# Patient Record
Sex: MALE | Race: BLACK OR AFRICAN AMERICAN | Employment: UNEMPLOYED
[De-identification: names, ages, dates, MRNs, and addresses within clinical notes are randomized per-mention and may not be internally consistent; named-entity substitution may affect disease eponyms.]

---

## 2024-05-14 ENCOUNTER — APPOINTMENT (OUTPATIENT)
Facility: HOSPITAL | Age: 34
End: 2024-05-14
Payer: MEDICAID

## 2024-05-14 ENCOUNTER — HOSPITAL ENCOUNTER (INPATIENT)
Facility: HOSPITAL | Age: 34
LOS: 4 days | Discharge: OTHER INSTITUTION | End: 2024-05-18
Attending: STUDENT IN AN ORGANIZED HEALTH CARE EDUCATION/TRAINING PROGRAM | Admitting: INTERNAL MEDICINE
Payer: MEDICAID

## 2024-05-14 DIAGNOSIS — R56.9 SEIZURE (HCC): Primary | ICD-10-CM

## 2024-05-14 LAB
ALBUMIN SERPL-MCNC: 3.5 G/DL (ref 3.4–5)
ALBUMIN/GLOB SERPL: 0.6 (ref 0.8–1.7)
ALP SERPL-CCNC: 49 U/L (ref 45–117)
ALT SERPL-CCNC: 24 U/L (ref 16–61)
AMPHET UR QL SCN: NEGATIVE
ANION GAP SERPL CALC-SCNC: 10 MMOL/L (ref 3–18)
APPEARANCE UR: CLEAR
AST SERPL-CCNC: 24 U/L (ref 10–38)
BARBITURATES UR QL SCN: NEGATIVE
BASOPHILS # BLD: 0 K/UL (ref 0–0.1)
BASOPHILS NFR BLD: 1 % (ref 0–2)
BENZODIAZ UR QL: NEGATIVE
BILIRUB DIRECT SERPL-MCNC: 0.2 MG/DL (ref 0–0.2)
BILIRUB SERPL-MCNC: 0.9 MG/DL (ref 0.2–1)
BILIRUB UR QL: NEGATIVE
BUN SERPL-MCNC: 13 MG/DL (ref 7–18)
BUN/CREAT SERPL: 11 (ref 12–20)
CALCIUM SERPL-MCNC: 10 MG/DL (ref 8.5–10.1)
CANNABINOIDS UR QL SCN: NEGATIVE
CHLORIDE SERPL-SCNC: 99 MMOL/L (ref 100–111)
CO2 SERPL-SCNC: 24 MMOL/L (ref 21–32)
COCAINE UR QL SCN: NEGATIVE
COLOR UR: YELLOW
CREAT SERPL-MCNC: 1.19 MG/DL (ref 0.6–1.3)
DIFFERENTIAL METHOD BLD: ABNORMAL
EOSINOPHIL # BLD: 0 K/UL (ref 0–0.4)
EOSINOPHIL NFR BLD: 1 % (ref 0–5)
ERYTHROCYTE [DISTWIDTH] IN BLOOD BY AUTOMATED COUNT: 12.5 % (ref 11.6–14.5)
ETHANOL SERPL-MCNC: <3 MG/DL (ref 0–3)
GLOBULIN SER CALC-MCNC: 5.5 G/DL (ref 2–4)
GLUCOSE SERPL-MCNC: 142 MG/DL (ref 74–99)
GLUCOSE UR STRIP.AUTO-MCNC: NEGATIVE MG/DL
HCT VFR BLD AUTO: 36.7 % (ref 36–48)
HGB BLD-MCNC: 12.2 G/DL (ref 13–16)
HGB UR QL STRIP: NEGATIVE
IMM GRANULOCYTES # BLD AUTO: 0 K/UL (ref 0–0.04)
IMM GRANULOCYTES NFR BLD AUTO: 0 % (ref 0–0.5)
KETONES UR QL STRIP.AUTO: NEGATIVE MG/DL
LACTATE SERPL-SCNC: 1.3 MMOL/L (ref 0.4–2)
LACTATE SERPL-SCNC: 3.5 MMOL/L (ref 0.4–2)
LACTATE SERPL-SCNC: 5.9 MMOL/L (ref 0.4–2)
LEUKOCYTE ESTERASE UR QL STRIP.AUTO: NEGATIVE
LIPASE SERPL-CCNC: 29 U/L (ref 13–75)
LYMPHOCYTES # BLD: 0.6 K/UL (ref 0.9–3.6)
LYMPHOCYTES NFR BLD: 10 % (ref 21–52)
Lab: NORMAL
MAGNESIUM SERPL-MCNC: 1.6 MG/DL (ref 1.6–2.6)
MCH RBC QN AUTO: 29.8 PG (ref 24–34)
MCHC RBC AUTO-ENTMCNC: 33.2 G/DL (ref 31–37)
MCV RBC AUTO: 89.7 FL (ref 78–100)
METHADONE UR QL: NEGATIVE
MONOCYTES # BLD: 0.5 K/UL (ref 0.05–1.2)
MONOCYTES NFR BLD: 8 % (ref 3–10)
NEUTS SEG # BLD: 4.5 K/UL (ref 1.8–8)
NEUTS SEG NFR BLD: 80 % (ref 40–73)
NITRITE UR QL STRIP.AUTO: NEGATIVE
NRBC # BLD: 0 K/UL (ref 0–0.01)
NRBC BLD-RTO: 0 PER 100 WBC
OPIATES UR QL: NEGATIVE
PCP UR QL: NEGATIVE
PH UR STRIP: 6.5 (ref 5–8)
PLATELET # BLD AUTO: 277 K/UL (ref 135–420)
PMV BLD AUTO: 10.1 FL (ref 9.2–11.8)
POTASSIUM SERPL-SCNC: 3.7 MMOL/L (ref 3.5–5.5)
PROT SERPL-MCNC: 9 G/DL (ref 6.4–8.2)
PROT UR STRIP-MCNC: NEGATIVE MG/DL
RBC # BLD AUTO: 4.09 M/UL (ref 4.35–5.65)
SODIUM SERPL-SCNC: 133 MMOL/L (ref 136–145)
SP GR UR REFRACTOMETRY: 1.01 (ref 1–1.03)
T PALLIDUM AB SER QL IA: NONREACTIVE
UROBILINOGEN UR QL STRIP.AUTO: 0.2 EU/DL (ref 0.2–1)
WBC # BLD AUTO: 5.6 K/UL (ref 4.6–13.2)

## 2024-05-14 PROCEDURE — 80076 HEPATIC FUNCTION PANEL: CPT

## 2024-05-14 PROCEDURE — 36415 COLL VENOUS BLD VENIPUNCTURE: CPT

## 2024-05-14 PROCEDURE — 87040 BLOOD CULTURE FOR BACTERIA: CPT

## 2024-05-14 PROCEDURE — 71045 X-RAY EXAM CHEST 1 VIEW: CPT

## 2024-05-14 PROCEDURE — 83735 ASSAY OF MAGNESIUM: CPT

## 2024-05-14 PROCEDURE — 85025 COMPLETE CBC W/AUTO DIFF WBC: CPT

## 2024-05-14 PROCEDURE — 83605 ASSAY OF LACTIC ACID: CPT

## 2024-05-14 PROCEDURE — 99285 EMERGENCY DEPT VISIT HI MDM: CPT

## 2024-05-14 PROCEDURE — 93005 ELECTROCARDIOGRAM TRACING: CPT | Performed by: STUDENT IN AN ORGANIZED HEALTH CARE EDUCATION/TRAINING PROGRAM

## 2024-05-14 PROCEDURE — 2580000003 HC RX 258: Performed by: STUDENT IN AN ORGANIZED HEALTH CARE EDUCATION/TRAINING PROGRAM

## 2024-05-14 PROCEDURE — 6360000002 HC RX W HCPCS: Performed by: STUDENT IN AN ORGANIZED HEALTH CARE EDUCATION/TRAINING PROGRAM

## 2024-05-14 PROCEDURE — 96376 TX/PRO/DX INJ SAME DRUG ADON: CPT

## 2024-05-14 PROCEDURE — 99222 1ST HOSP IP/OBS MODERATE 55: CPT | Performed by: INTERNAL MEDICINE

## 2024-05-14 PROCEDURE — 96374 THER/PROPH/DIAG INJ IV PUSH: CPT

## 2024-05-14 PROCEDURE — 82077 ASSAY SPEC XCP UR&BREATH IA: CPT

## 2024-05-14 PROCEDURE — 2580000003 HC RX 258: Performed by: INTERNAL MEDICINE

## 2024-05-14 PROCEDURE — 70450 CT HEAD/BRAIN W/O DYE: CPT

## 2024-05-14 PROCEDURE — 6370000000 HC RX 637 (ALT 250 FOR IP): Performed by: INTERNAL MEDICINE

## 2024-05-14 PROCEDURE — 80307 DRUG TEST PRSMV CHEM ANLYZR: CPT

## 2024-05-14 PROCEDURE — 6360000002 HC RX W HCPCS: Performed by: INTERNAL MEDICINE

## 2024-05-14 PROCEDURE — 86780 TREPONEMA PALLIDUM: CPT

## 2024-05-14 PROCEDURE — 83690 ASSAY OF LIPASE: CPT

## 2024-05-14 PROCEDURE — 81003 URINALYSIS AUTO W/O SCOPE: CPT

## 2024-05-14 PROCEDURE — 80048 BASIC METABOLIC PNL TOTAL CA: CPT

## 2024-05-14 PROCEDURE — 93005 ELECTROCARDIOGRAM TRACING: CPT | Performed by: INTERNAL MEDICINE

## 2024-05-14 PROCEDURE — 1100000003 HC PRIVATE W/ TELEMETRY

## 2024-05-14 RX ORDER — MIRTAZAPINE 15 MG/1
15 TABLET, FILM COATED ORAL NIGHTLY
Status: ON HOLD | COMMUNITY
End: 2024-05-18

## 2024-05-14 RX ORDER — ACETAMINOPHEN 325 MG/1
650 TABLET ORAL EVERY 6 HOURS PRN
Status: DISCONTINUED | OUTPATIENT
Start: 2024-05-14 | End: 2024-05-18 | Stop reason: HOSPADM

## 2024-05-14 RX ORDER — POLYETHYLENE GLYCOL 3350 17 G/17G
17 POWDER, FOR SOLUTION ORAL DAILY PRN
Status: DISCONTINUED | OUTPATIENT
Start: 2024-05-14 | End: 2024-05-18 | Stop reason: HOSPADM

## 2024-05-14 RX ORDER — SODIUM CHLORIDE 0.9 % (FLUSH) 0.9 %
5-40 SYRINGE (ML) INJECTION PRN
Status: DISCONTINUED | OUTPATIENT
Start: 2024-05-14 | End: 2024-05-18 | Stop reason: HOSPADM

## 2024-05-14 RX ORDER — LEVETIRACETAM 500 MG/5ML
500 INJECTION, SOLUTION, CONCENTRATE INTRAVENOUS ONCE
Status: COMPLETED | OUTPATIENT
Start: 2024-05-14 | End: 2024-05-14

## 2024-05-14 RX ORDER — POTASSIUM CHLORIDE 7.45 MG/ML
10 INJECTION INTRAVENOUS PRN
Status: DISCONTINUED | OUTPATIENT
Start: 2024-05-14 | End: 2024-05-18 | Stop reason: HOSPADM

## 2024-05-14 RX ORDER — MIRTAZAPINE 15 MG/1
15 TABLET, FILM COATED ORAL NIGHTLY
Status: DISCONTINUED | OUTPATIENT
Start: 2024-05-14 | End: 2024-05-18 | Stop reason: HOSPADM

## 2024-05-14 RX ORDER — LORAZEPAM 2 MG/ML
2 INJECTION INTRAMUSCULAR EVERY 5 MIN PRN
Status: DISCONTINUED | OUTPATIENT
Start: 2024-05-14 | End: 2024-05-18 | Stop reason: HOSPADM

## 2024-05-14 RX ORDER — SODIUM CHLORIDE 0.9 % (FLUSH) 0.9 %
5-40 SYRINGE (ML) INJECTION EVERY 12 HOURS SCHEDULED
Status: DISCONTINUED | OUTPATIENT
Start: 2024-05-14 | End: 2024-05-18 | Stop reason: HOSPADM

## 2024-05-14 RX ORDER — ACETAMINOPHEN 650 MG/1
650 SUPPOSITORY RECTAL EVERY 6 HOURS PRN
Status: DISCONTINUED | OUTPATIENT
Start: 2024-05-14 | End: 2024-05-18 | Stop reason: HOSPADM

## 2024-05-14 RX ORDER — ALBUTEROL SULFATE 0.63 MG/3ML
1 SOLUTION RESPIRATORY (INHALATION) EVERY 6 HOURS PRN
COMMUNITY

## 2024-05-14 RX ORDER — TRIAMCINOLONE ACETONIDE 1 MG/G
1 CREAM TOPICAL 2 TIMES DAILY
Status: ON HOLD | COMMUNITY
Start: 2024-02-27 | End: 2024-05-18

## 2024-05-14 RX ORDER — LORAZEPAM 2 MG/ML
4 INJECTION INTRAMUSCULAR EVERY 5 MIN PRN
Status: DISCONTINUED | OUTPATIENT
Start: 2024-05-14 | End: 2024-05-14

## 2024-05-14 RX ORDER — SODIUM CHLORIDE, SODIUM LACTATE, POTASSIUM CHLORIDE, CALCIUM CHLORIDE 600; 310; 30; 20 MG/100ML; MG/100ML; MG/100ML; MG/100ML
INJECTION, SOLUTION INTRAVENOUS CONTINUOUS
Status: DISPENSED | OUTPATIENT
Start: 2024-05-14 | End: 2024-05-16

## 2024-05-14 RX ORDER — MULTIVITAMIN
15 TABLET ORAL DAILY
Status: ON HOLD | COMMUNITY
Start: 2024-04-12 | End: 2024-05-18

## 2024-05-14 RX ORDER — CETIRIZINE HYDROCHLORIDE 10 MG/1
10 TABLET ORAL DAILY
Status: ON HOLD | COMMUNITY
Start: 2024-04-12 | End: 2024-05-18

## 2024-05-14 RX ORDER — FOLIC ACID 1 MG/1
1 TABLET ORAL DAILY
Status: ON HOLD | COMMUNITY
End: 2024-05-18

## 2024-05-14 RX ORDER — CETIRIZINE HYDROCHLORIDE 10 MG/1
10 TABLET ORAL DAILY
Status: DISCONTINUED | OUTPATIENT
Start: 2024-05-14 | End: 2024-05-18 | Stop reason: HOSPADM

## 2024-05-14 RX ORDER — ENOXAPARIN SODIUM 100 MG/ML
40 INJECTION SUBCUTANEOUS EVERY 24 HOURS
Status: DISCONTINUED | OUTPATIENT
Start: 2024-05-14 | End: 2024-05-14

## 2024-05-14 RX ORDER — HYDROXYZINE HYDROCHLORIDE 10 MG/1
10 TABLET, FILM COATED ORAL 3 TIMES DAILY PRN
Status: DISCONTINUED | OUTPATIENT
Start: 2024-05-14 | End: 2024-05-18 | Stop reason: HOSPADM

## 2024-05-14 RX ORDER — MAGNESIUM SULFATE IN WATER 40 MG/ML
2000 INJECTION, SOLUTION INTRAVENOUS PRN
Status: DISCONTINUED | OUTPATIENT
Start: 2024-05-14 | End: 2024-05-18 | Stop reason: HOSPADM

## 2024-05-14 RX ORDER — MULTIVITAMIN WITH IRON
1 TABLET ORAL DAILY
Status: DISCONTINUED | OUTPATIENT
Start: 2024-05-14 | End: 2024-05-18 | Stop reason: HOSPADM

## 2024-05-14 RX ORDER — TRIAMCINOLONE ACETONIDE 1 MG/G
CREAM TOPICAL 2 TIMES DAILY
Status: DISCONTINUED | OUTPATIENT
Start: 2024-05-15 | End: 2024-05-18 | Stop reason: HOSPADM

## 2024-05-14 RX ORDER — FOLIC ACID 1 MG/1
1 TABLET ORAL DAILY
Status: DISCONTINUED | OUTPATIENT
Start: 2024-05-14 | End: 2024-05-18 | Stop reason: HOSPADM

## 2024-05-14 RX ORDER — SODIUM CHLORIDE 9 MG/ML
INJECTION, SOLUTION INTRAVENOUS PRN
Status: DISCONTINUED | OUTPATIENT
Start: 2024-05-14 | End: 2024-05-18 | Stop reason: HOSPADM

## 2024-05-14 RX ORDER — LEVETIRACETAM 500 MG/5ML
1000 INJECTION, SOLUTION, CONCENTRATE INTRAVENOUS ONCE
Status: COMPLETED | OUTPATIENT
Start: 2024-05-14 | End: 2024-05-14

## 2024-05-14 RX ORDER — HEPARIN SODIUM 5000 [USP'U]/ML
5000 INJECTION, SOLUTION INTRAVENOUS; SUBCUTANEOUS EVERY 8 HOURS SCHEDULED
Status: DISCONTINUED | OUTPATIENT
Start: 2024-05-14 | End: 2024-05-18 | Stop reason: HOSPADM

## 2024-05-14 RX ORDER — ONDANSETRON 4 MG/1
4 TABLET, ORALLY DISINTEGRATING ORAL EVERY 8 HOURS PRN
Status: DISCONTINUED | OUTPATIENT
Start: 2024-05-14 | End: 2024-05-18 | Stop reason: HOSPADM

## 2024-05-14 RX ORDER — ONDANSETRON 2 MG/ML
4 INJECTION INTRAMUSCULAR; INTRAVENOUS EVERY 6 HOURS PRN
Status: DISCONTINUED | OUTPATIENT
Start: 2024-05-14 | End: 2024-05-18 | Stop reason: HOSPADM

## 2024-05-14 RX ORDER — LEVETIRACETAM 500 MG/1
500 TABLET ORAL 2 TIMES DAILY
Status: DISCONTINUED | OUTPATIENT
Start: 2024-05-14 | End: 2024-05-18 | Stop reason: HOSPADM

## 2024-05-14 RX ORDER — SODIUM CHLORIDE, SODIUM LACTATE, POTASSIUM CHLORIDE, AND CALCIUM CHLORIDE .6; .31; .03; .02 G/100ML; G/100ML; G/100ML; G/100ML
1000 INJECTION, SOLUTION INTRAVENOUS ONCE
Status: COMPLETED | OUTPATIENT
Start: 2024-05-14 | End: 2024-05-14

## 2024-05-14 RX ADMIN — LEVETIRACETAM 500 MG: 500 TABLET, FILM COATED ORAL at 20:33

## 2024-05-14 RX ADMIN — FOLIC ACID 1 MG: 1 TABLET ORAL at 20:21

## 2024-05-14 RX ADMIN — LEVETIRACETAM 1000 MG: 100 INJECTION, SOLUTION, CONCENTRATE INTRAVENOUS at 16:11

## 2024-05-14 RX ADMIN — ENOXAPARIN SODIUM 40 MG: 100 INJECTION SUBCUTANEOUS at 20:22

## 2024-05-14 RX ADMIN — SODIUM CHLORIDE, POTASSIUM CHLORIDE, SODIUM LACTATE AND CALCIUM CHLORIDE: 600; 310; 30; 20 INJECTION, SOLUTION INTRAVENOUS at 20:21

## 2024-05-14 RX ADMIN — CETIRIZINE HYDROCHLORIDE 10 MG: 10 TABLET, FILM COATED ORAL at 20:21

## 2024-05-14 RX ADMIN — SODIUM CHLORIDE, PRESERVATIVE FREE 10 ML: 5 INJECTION INTRAVENOUS at 20:22

## 2024-05-14 RX ADMIN — SODIUM CHLORIDE, POTASSIUM CHLORIDE, SODIUM LACTATE AND CALCIUM CHLORIDE 1000 ML: 600; 310; 30; 20 INJECTION, SOLUTION INTRAVENOUS at 13:47

## 2024-05-14 RX ADMIN — MIRTAZAPINE 15 MG: 15 TABLET, FILM COATED ORAL at 20:21

## 2024-05-14 RX ADMIN — THERA TABS 1 TABLET: TAB at 20:21

## 2024-05-14 RX ADMIN — LEVETIRACETAM 500 MG: 100 INJECTION, SOLUTION, CONCENTRATE INTRAVENOUS at 13:47

## 2024-05-14 ASSESSMENT — PAIN SCALES - GENERAL
PAINLEVEL_OUTOF10: 0
PAINLEVEL_OUTOF10: 0

## 2024-05-14 ASSESSMENT — PAIN - FUNCTIONAL ASSESSMENT: PAIN_FUNCTIONAL_ASSESSMENT: NONE - DENIES PAIN

## 2024-05-14 NOTE — ED TRIAGE NOTES
Pt arrived via EMS from [facility] recovery c/o seizures  x2 1 min-2 min seizures. Pt was post ictal once EMS arrived. Pt c/o N/V at [facility].

## 2024-05-14 NOTE — ED NOTES
..TRANSFER - OUT REPORT:    Verbal report given to XAVI Denis on Robert BRICENO Washington  being transferred to [address] [place] [address] for routine progression of patient care       Report consisted of patient's Situation, Background, Assessment and   Recommendations(SBAR).     Information from the following report(s) ED SBAR was reviewed with the receiving nurse.    Elias Fall Assessment:    Presents to emergency department  because of falls (Syncope, seizure, or loss of consciousness): Yes  Age > 70: No  Altered Mental Status, Intoxication with alcohol or substance confusion (Disorientation, impaired judgment, poor safety awaremess, or inability to follow instructions): No  Impaired Mobility: Ambulates or transfers with assistive devices or assistance; Unable to ambulate or transer.: No  Nursing Judgement: No          Lines:   Peripheral IV 05/14/24 Left Antecubital (Active)   Site Assessment Clean, dry & intact 05/14/24 1314   Line Status Blood return noted 05/14/24 1314   Phlebitis Assessment No symptoms 05/14/24 1314   Infiltration Assessment 0 05/14/24 1314        Opportunity for questions and clarification was provided.      Patient transported with:  transport

## 2024-05-14 NOTE — ED PROVIDER NOTES
EMERGENCY DEPARTMENT HISTORY AND PHYSICAL EXAM      Date: 5/14/2024  Patient Name: Robert Soto    History of Presenting Illness     Chief Complaint   Patient presents with    Seizures       History (Context): Robert Soto is a 33 y.o. male with history of alcohol use disorder with recent detox and rehab attendance which she completed 2 weeks ago reportedly and states a couple days ago he had 5-6 drinks, denies any history of complicated withdrawal.  Additional history of seizure reportedly as a child, has not had an episode since and has not been on any medications nor required them reportedly.  He presents to the ED today with chief complaint of reported loss of consciousness with seizure-like activity while at [facility].  Patient does not recall events, reportedly they lasted 1 to 2 minutes involved generalized convulsion, 2 episodes.  EMS states patient was postictal on their arrival, blood sugar on scene 120.  Patient denies any recent infectious symptoms, chest pain, lightheadedness, abdominal pain, nausea/vomiting/diarrhea.  He has an extensive mucocutaneous rash in various stages of healing involving the nares, face, trunk, extremities.  On review of the record patient was evaluated by [facility] dermatology with pathology sample sent felt to be overall most consistent with sarcoidosis.  Patient denies any acute change in the rash.      PCP: No primary care provider on file.    Current Facility-Administered Medications   Medication Dose Route Frequency Provider Last Rate Last Admin    lactated ringers bolus 1,000 mL  1,000 mL IntraVENous Once Micky Campbell .9 mL/hr at 05/14/24 1347 1,000 mL at 05/14/24 1347     Current Outpatient Medications   Medication Sig Dispense Refill    albuterol (ACCUNEB) 0.63 MG/3ML nebulizer solution Take 3 mLs by nebulization every 6 hours as needed for Wheezing         Past History     Past Medical History:   Past Medical History:   Diagnosis Date  ETOH    Lipase    Urinalysis    Urine Drug Screen    T. pallidum Ab    EKG 12 Lead (Syncope)          ED Course:   ED Course as of 05/14/24 1913   Tue May 14, 2024   1321 32 yo male etoh abuse  cc- seizure from [facility] / came out of rehab after detox / drank 5-6 days ago /poc-120 / physical exam- fasciculation of the eyes when its closed. Lesions on his face and hands / plan- labs and imaging     Of note patient states that he has skin lesions.  This has been going on for years.  Patient has been seen by dermatologist. [KS]   1347 EKG: Sinus rhythm, heart rate 74, TN QTc within normal limits, left axis deviation, no acute ischemic changes. [KS]   1623 Lab work as reviewed by me notably without leukocytosis, no significant anemia, no thrombocytosis or thrombocytopenia, no significant electrolyte derangement, creatinine 1.19 without comparison, no urinary complaints, no significant hepatic panel abnormality, T. pallidum antibody nonreactive, UDS unremarkable, initial lactate elevated and downtrending consistent with reported seizure, UA unremarkable.  EtOH negative, lipase not elevated.  Loaded patient with 1500 mg Keppra, CT head pending at this time, no changes in status here, no seizure-like activity, no concerning hemodynamic trends.  Pending CT head findings we will plan for discussion with neurology for follow-up and likely disposition for outpatient workup. [JS]   4532 CT head Noncon showing:  IMPRESSION:  Increased hypoattenuation of the right temporal lobe white matter, nonspecific  and indeterminate etiology. Herpes encephalitis could be considered. Recommend  further assessment with MRI.  Overall exam and history less consistent with repeat encephalitis, question whether this could be related to neurosarcoid given patient's extensive dermatologic lesions.  Neurology paged at this time to discuss findings and recommendations. [JS]   6998 Disc findings with Dr. Tucker [JS]      ED Course User Index  [JS]

## 2024-05-14 NOTE — ED NOTES
Pt to CT via transport, pt tolerated medication administration well, no adverse reactions noted, plan of care ongoing

## 2024-05-14 NOTE — ED NOTES
Pt has urinal at bedside for urine specimen collection, pt states unable to use it at this time, plan of care ongoing

## 2024-05-14 NOTE — H&P
History & Physical    Patient: Robert Soto MRN: [id number]  [facility]: [id number]    YOB: 1990  Age: 33 y.o.  Sex: male      DOA: 5/14/2024    Chief Complaint:   Chief Complaint   Patient presents with    Seizures          HPI:     Robert Soto is a 33 y.o.  male with a past medical history of asthma, alcohol abuse, chronic facial and body rash who presents from [facility] for seizure.  Patient notes he does not remember much but he was reported to have had 2 seizures at [facility] which were witnessed by fellow residents.  He was reported to have convulsive like activity.  The patient does not remember biting his tongue or anything about the seizures.  He reports that he has never had a seizure before to his knowledge.  Regarding the patient's rash it has been present for over a year, a biopsy at [facility] seemed to suggest possible sarcoidosis however the patient did not follow-up with them in the office.  Recently seen in the [facility] ED in February 2024 for this rash however he was discharged with recommended follow-up with dermatology.  Patient has had multiple visits to other hospitals for visual hallucinations, corneal ulcer, patient also recently discharged from inpatient psychiatry unit for substance-induced mood disorder and adjustment disorder with depression.  The patient verbalized suicidal intent at that time but denies it currently.  The patient was recommended to take mirtazapine and naltrexone on discharge from the psychiatric unit in February 2024.  The patient has not had a drink for roughly a week since he has been at [facility] doing inpatient detox.    In the emergency department it was noted the patient was afebrile, with a respiratory rate of 18, a heart rate of 92, a blood pressure of 115/82 and oxygen sat of 100% on room air.  Labs were notable for sodium of 133, a chloride of 99, a lactic acid of 5.9, a hemoglobin of 12.2.  CT head showed increased  admitted to 4 S.  Cardiac monitoring  Seizure precautions  Ativan as needed for seizure activity  Keppra 500 mg twice daily as recommended by neurology  Discussed case with neurology, they recommended a MRI with and without contrast to evaluate for neurosarcoidosis.  Neurosarcoidosis is thought to be less likely.  Neurology recommended against systemic steroids.  Seizure possibly related to alcohol withdrawal, patient reports last drink about 5 to 6 days ago.  Corticosteroid topical twice daily on arms and legs ordered  Neurology consulted  ID consulted, they recommended MRI with and without contrast, hold acyclovir for now. Less suspicion for herpes encephalitis   Lumbar puncture ordered, fluid studies ordered  EEG ordered  Lactate has normalized  CK level ordered  Urine drug screen is negative  PT/OT  Patient requesting psychiatry consult in the morning, denies suicidal intent  Resume Remeron 15 mg and hydroxyzine for anxiety    DVT/GI Prophylaxis: Lovenox    Discussed with patient and  at bedside about hospital admission and my plan care, both understood and agree with my plan care.    Daniel Pagan, DO  5/14/2024 7:28 PM    Disclaimer: Sections of this note are dictated using utilizing voice recognition software. Minor typographical errors may be present. If questions arise, please do not hesitate to contact me or call our department.

## 2024-05-15 ENCOUNTER — APPOINTMENT (OUTPATIENT)
Facility: HOSPITAL | Age: 34
End: 2024-05-15
Payer: MEDICAID

## 2024-05-15 PROBLEM — B00.4 ENCEPHALITIS DUE TO HUMAN HERPES SIMPLEX VIRUS (HSV): Status: ACTIVE | Noted: 2024-05-15

## 2024-05-15 LAB
ALBUMIN SERPL-MCNC: 3.1 G/DL (ref 3.4–5)
ALBUMIN/GLOB SERPL: 0.6 (ref 0.8–1.7)
ALP SERPL-CCNC: 41 U/L (ref 45–117)
ALT SERPL-CCNC: 21 U/L (ref 16–61)
ANION GAP SERPL CALC-SCNC: 4 MMOL/L (ref 3–18)
AST SERPL-CCNC: 23 U/L (ref 10–38)
BASOPHILS # BLD: 0 K/UL (ref 0–0.1)
BASOPHILS NFR BLD: 1 % (ref 0–2)
BILIRUB SERPL-MCNC: 1 MG/DL (ref 0.2–1)
BUN SERPL-MCNC: 14 MG/DL (ref 7–18)
BUN/CREAT SERPL: 16 (ref 12–20)
CALCIUM SERPL-MCNC: 9.2 MG/DL (ref 8.5–10.1)
CHLORIDE SERPL-SCNC: 103 MMOL/L (ref 100–111)
CK SERPL-CCNC: 97 U/L (ref 39–308)
CO2 SERPL-SCNC: 29 MMOL/L (ref 21–32)
CREAT SERPL-MCNC: 0.86 MG/DL (ref 0.6–1.3)
CYTOLOGY-NON GYN: NORMAL
DIFFERENTIAL METHOD BLD: ABNORMAL
EKG ATRIAL RATE: 74 BPM
EKG ATRIAL RATE: 74 BPM
EKG DIAGNOSIS: NORMAL
EKG DIAGNOSIS: NORMAL
EKG P AXIS: 64 DEGREES
EKG P AXIS: 66 DEGREES
EKG P-R INTERVAL: 128 MS
EKG P-R INTERVAL: 136 MS
EKG Q-T INTERVAL: 386 MS
EKG Q-T INTERVAL: 398 MS
EKG QRS DURATION: 100 MS
EKG QRS DURATION: 104 MS
EKG QTC CALCULATION (BAZETT): 428 MS
EKG QTC CALCULATION (BAZETT): 441 MS
EKG R AXIS: -15 DEGREES
EKG R AXIS: -33 DEGREES
EKG T AXIS: 12 DEGREES
EKG T AXIS: 20 DEGREES
EKG VENTRICULAR RATE: 74 BPM
EKG VENTRICULAR RATE: 74 BPM
EOSINOPHIL # BLD: 0.1 K/UL (ref 0–0.4)
EOSINOPHIL NFR BLD: 2 % (ref 0–5)
ERYTHROCYTE [DISTWIDTH] IN BLOOD BY AUTOMATED COUNT: 12.6 % (ref 11.6–14.5)
GLOBULIN SER CALC-MCNC: 4.8 G/DL (ref 2–4)
GLUCOSE CSF-MCNC: 45 MG/DL (ref 40–70)
GLUCOSE SERPL-MCNC: 77 MG/DL (ref 74–99)
HCT VFR BLD AUTO: 33.3 % (ref 36–48)
HGB BLD-MCNC: 10.9 G/DL (ref 13–16)
IMM GRANULOCYTES # BLD AUTO: 0 K/UL (ref 0–0.04)
IMM GRANULOCYTES NFR BLD AUTO: 1 % (ref 0–0.5)
INR PPP: 1.1 (ref 0.9–1.1)
LYMPHOCYTES # BLD: 0.6 K/UL (ref 0.9–3.6)
LYMPHOCYTES NFR BLD: 15 % (ref 21–52)
MCH RBC QN AUTO: 29.4 PG (ref 24–34)
MCHC RBC AUTO-ENTMCNC: 32.7 G/DL (ref 31–37)
MCV RBC AUTO: 89.8 FL (ref 78–100)
MONOCYTES # BLD: 0.6 K/UL (ref 0.05–1.2)
MONOCYTES NFR BLD: 15 % (ref 3–10)
NEUTS SEG # BLD: 2.7 K/UL (ref 1.8–8)
NEUTS SEG NFR BLD: 67 % (ref 40–73)
NRBC # BLD: 0 K/UL (ref 0–0.01)
NRBC BLD-RTO: 0 PER 100 WBC
PLATELET # BLD AUTO: 275 K/UL (ref 135–420)
PMV BLD AUTO: 10.3 FL (ref 9.2–11.8)
POTASSIUM SERPL-SCNC: 3.7 MMOL/L (ref 3.5–5.5)
PROT CSF-MCNC: 372 MG/DL (ref 15–45)
PROT SERPL-MCNC: 7.9 G/DL (ref 6.4–8.2)
PROTHROMBIN TIME: 14.3 SEC (ref 11.9–14.7)
RBC # BLD AUTO: 3.71 M/UL (ref 4.35–5.65)
SODIUM SERPL-SCNC: 136 MMOL/L (ref 136–145)
TUBE # CSF: 1
TUBE # CSF: 1
WBC # BLD AUTO: 4.1 K/UL (ref 4.6–13.2)

## 2024-05-15 PROCEDURE — 99221 1ST HOSP IP/OBS SF/LOW 40: CPT | Performed by: PSYCHIATRY & NEUROLOGY

## 2024-05-15 PROCEDURE — 82945 GLUCOSE OTHER FLUID: CPT

## 2024-05-15 PROCEDURE — 93010 ELECTROCARDIOGRAM REPORT: CPT | Performed by: INTERNAL MEDICINE

## 2024-05-15 PROCEDURE — 87255 GENET VIRUS ISOLATE HSV: CPT

## 2024-05-15 PROCEDURE — 36415 COLL VENOUS BLD VENIPUNCTURE: CPT

## 2024-05-15 PROCEDURE — 86592 SYPHILIS TEST NON-TREP QUAL: CPT

## 2024-05-15 PROCEDURE — 6370000000 HC RX 637 (ALT 250 FOR IP): Performed by: INTERNAL MEDICINE

## 2024-05-15 PROCEDURE — 97161 PT EVAL LOW COMPLEX 20 MIN: CPT

## 2024-05-15 PROCEDURE — 6360000002 HC RX W HCPCS: Performed by: INTERNAL MEDICINE

## 2024-05-15 PROCEDURE — 84157 ASSAY OF PROTEIN OTHER: CPT

## 2024-05-15 PROCEDURE — 1100000003 HC PRIVATE W/ TELEMETRY

## 2024-05-15 PROCEDURE — 85025 COMPLETE CBC W/AUTO DIFF WBC: CPT

## 2024-05-15 PROCEDURE — 87070 CULTURE OTHR SPECIMN AEROBIC: CPT

## 2024-05-15 PROCEDURE — 80053 COMPREHEN METABOLIC PANEL: CPT

## 2024-05-15 PROCEDURE — 89050 BODY FLUID CELL COUNT: CPT

## 2024-05-15 PROCEDURE — 009U3ZX DRAINAGE OF SPINAL CANAL, PERCUTANEOUS APPROACH, DIAGNOSTIC: ICD-10-PCS | Performed by: PHYSICIAN ASSISTANT

## 2024-05-15 PROCEDURE — 97535 SELF CARE MNGMENT TRAINING: CPT

## 2024-05-15 PROCEDURE — 88108 CYTOPATH CONCENTRATE TECH: CPT

## 2024-05-15 PROCEDURE — 87389 HIV-1 AG W/HIV-1&-2 AB AG IA: CPT

## 2024-05-15 PROCEDURE — 87483 CNS DNA AMP PROBE TYPE 12-25: CPT

## 2024-05-15 PROCEDURE — 85610 PROTHROMBIN TIME: CPT

## 2024-05-15 PROCEDURE — 97116 GAIT TRAINING THERAPY: CPT

## 2024-05-15 PROCEDURE — 2580000003 HC RX 258: Performed by: INTERNAL MEDICINE

## 2024-05-15 PROCEDURE — 82550 ASSAY OF CK (CPK): CPT

## 2024-05-15 PROCEDURE — 94761 N-INVAS EAR/PLS OXIMETRY MLT: CPT

## 2024-05-15 PROCEDURE — 87205 SMEAR GRAM STAIN: CPT

## 2024-05-15 PROCEDURE — 97165 OT EVAL LOW COMPLEX 30 MIN: CPT

## 2024-05-15 PROCEDURE — 82164 ANGIOTENSIN I ENZYME TEST: CPT

## 2024-05-15 PROCEDURE — 2709999900 FL LUMBAR PUNCTURE DIAG

## 2024-05-15 PROCEDURE — 6360000002 HC RX W HCPCS: Performed by: FAMILY MEDICINE

## 2024-05-15 RX ORDER — MAGNESIUM SULFATE 1 G/100ML
1000 INJECTION INTRAVENOUS ONCE
Status: COMPLETED | OUTPATIENT
Start: 2024-05-15 | End: 2024-05-15

## 2024-05-15 RX ORDER — LORAZEPAM 2 MG/ML
1 INJECTION INTRAMUSCULAR ONCE
Status: COMPLETED | OUTPATIENT
Start: 2024-05-15 | End: 2024-05-15

## 2024-05-15 RX ADMIN — HEPARIN SODIUM 5000 UNITS: 5000 INJECTION INTRAVENOUS; SUBCUTANEOUS at 21:24

## 2024-05-15 RX ADMIN — LEVETIRACETAM 500 MG: 500 TABLET, FILM COATED ORAL at 21:24

## 2024-05-15 RX ADMIN — HEPARIN SODIUM 5000 UNITS: 5000 INJECTION INTRAVENOUS; SUBCUTANEOUS at 06:14

## 2024-05-15 RX ADMIN — MIRTAZAPINE 15 MG: 15 TABLET, FILM COATED ORAL at 21:24

## 2024-05-15 RX ADMIN — SODIUM CHLORIDE, PRESERVATIVE FREE 10 ML: 5 INJECTION INTRAVENOUS at 10:55

## 2024-05-15 RX ADMIN — FOLIC ACID 1 MG: 1 TABLET ORAL at 10:53

## 2024-05-15 RX ADMIN — MAGNESIUM SULFATE HEPTAHYDRATE 1000 MG: 1 INJECTION, SOLUTION INTRAVENOUS at 02:02

## 2024-05-15 RX ADMIN — THERA TABS 1 TABLET: TAB at 10:53

## 2024-05-15 RX ADMIN — SODIUM CHLORIDE, PRESERVATIVE FREE 10 ML: 5 INJECTION INTRAVENOUS at 10:54

## 2024-05-15 RX ADMIN — LEVETIRACETAM 500 MG: 500 TABLET, FILM COATED ORAL at 10:53

## 2024-05-15 RX ADMIN — SODIUM CHLORIDE, PRESERVATIVE FREE 10 ML: 5 INJECTION INTRAVENOUS at 21:24

## 2024-05-15 RX ADMIN — TRIAMCINOLONE ACETONIDE: 1 CREAM TOPICAL at 10:53

## 2024-05-15 RX ADMIN — LORAZEPAM 1 MG: 2 INJECTION, SOLUTION INTRAMUSCULAR; INTRAVENOUS at 14:49

## 2024-05-15 RX ADMIN — TRIAMCINOLONE ACETONIDE: 1 CREAM TOPICAL at 21:23

## 2024-05-15 RX ADMIN — CETIRIZINE HYDROCHLORIDE 10 MG: 10 TABLET, FILM COATED ORAL at 10:53

## 2024-05-15 ASSESSMENT — PAIN SCALES - GENERAL
PAINLEVEL_OUTOF10: 0

## 2024-05-15 NOTE — PROGRESS NOTES
Physical Therapy  PHYSICAL THERAPY EVALUATION/DISCHARGE    Patient: Robert Soto (33 y.o. male)  Date: 5/15/2024  Primary Diagnosis: Seizure (HCC) [R56.9]  Seizure-like activity (HCC) [R56.9]       Precautions: Fall Risk, Seizure,  ,  ,  ,  ,  ,  ,    PLOF: Admitted from [facility], level entry. Ind prior to admission.     ASSESSMENT AND RECOMMENDATIONS:  Pt received in recliner in NAD and agreeable to PT evaluation. Pt demonstrates 5/5 strength BLE for knee extension, hip flexion, and ankle DF/PF with intact sensation. Pt sit to stand ind and ambulates with supervision 2/2 IV pole management. Ambulates with typical pace and minimal to no deviations. Denies lightheadedness or dizziness. Returns to recliner, all needs and call bell let within reach. Pt presents with minimal to no deficits, no skilled acute care PT is indicated at this time.     Patient does not require further skilled physical therapy intervention at this level of care.    Further Equipment Recommendations for Discharge:  n/a at this time    [facility]: -[facility] Inpatient Mobility Raw Score : 24        At this time and based on an -PAC score, no further PT is recommended upon discharge due to (i.e. patient at baseline functional status…etc…).  Recommend patient returns to prior setting with prior services.    This [facility] score should be considered in conjunction with interdisciplinary team recommendations to determine the most appropriate discharge setting. Patient's social support, diagnosis, medical stability, and prior level of function should also be taken into consideration.     SUBJECTIVE:   Patient stated “I feel a little jude.”    OBJECTIVE DATA SUMMARY:     Past Medical History:   Diagnosis Date    Asthma     Seizures (HCC)    No past surgical history on file.    Home Situation:  Social/Functional History  Type of Home: Facility  Home Layout: One level  Home Access: Level entry  Home Equipment: None  Has the patient had two or more falls  in the past year or any fall with injury in the past year?: No  ADL Assistance: Independent  Ambulation Assistance: Independent  Transfer Assistance: Independent  Critical Behavior:  Orientation  Overall Orientation Status: Within Normal Limits  Orientation Level: Oriented X4  Cognition  Overall Cognitive Status: WNL    Strength:    Strength: Within functional limits    Tone & Sensation:   Tone: Normal  Sensation: Intact    Range Of Motion:  AROM: Within functional limits  PROM: Within functional limits    Functional Mobility:  Bed Mobility:     Bed Mobility Training  Bed Mobility Training: No  Supine to Sit: Modified independent  Scooting: Modified independent  Transfers:     Transfer Training  Transfer Training: Yes  Sit to Stand: Independent  Stand to Sit: Independent  Bed to Chair: Supervision  Balance:               Balance  Sitting: Intact  Standing: Intact          Ambulation/Gait Training:                       Gait  Gait Training: Yes  Overall Level of Assistance: Supervision  Distance (ft): 20 Feet  Assistive Device: None                     Pain:  Intensity Pre-treatment: 0/10   Intensity Post-treatment: 0/10  Scale: Numeric Rating Scale    Activity Tolerance:   Activity Tolerance: Patient tolerated evaluation without incident  Please refer to the flowsheet for vital signs taken during this treatment.    After treatment:   [x]         Patient left in no apparent distress sitting up in chair  []         Patient left in no apparent distress in bed  [x]         Call bell left within reach  [x]         Nursing notified  []         Caregiver present  [x]         Chair alarm activated  []         SCDs applied    COMMUNICATION/EDUCATION:   Patient Education  Education Given To: Patient  Education Provided: Role of Therapy;Plan of Care;Home Exercise Program  Education Method: Demonstration;Verbal;Teach Back  Barriers to Learning: None  Education Outcome: Verbalized understanding;Demonstrated

## 2024-05-15 NOTE — PROCEDURES
RADIOLOGY POST PROCEDURE NOTE     May 15, 2024       3:53 PM     Preoperative Diagnosis:   seizure like activity    Postoperative Diagnosis:  Same.    :  MARNI Castillo    Assistant:  None.    Type of Anesthesia: 1% plain lidocaine    Procedure/Description:  Image-guided L3-4 lumbar puncture    Findings:   successful L3-4 lumbar puncture. Images saved. Opening pressure 17 cmH2O. 9.5 ml of clear CSF obtained.    Estimated blood Loss:  Minimal    Specimen Removed:  yes    Blood transfusions:  None.    Implants:  None.    Complications: None    Condition: Stable    Blood thinning medications: OK to resume as clinically indicated    Discharge Plan:  continue present therapy    MARNI Castillo

## 2024-05-15 NOTE — PROGRESS NOTES
OCCUPATIONAL THERAPY EVALUATION/DISCHARGE    Patient: Robert Soto (33 y.o. male)  Date: 5/15/2024  Primary Diagnosis: Seizure (HCC) [R56.9]  Seizure-like activity (HCC) [R56.9]  Precautions: Fall Risk, Seizure,  PLOF: Patient was independent with self-care and functional mobility PTA.      ASSESSMENT AND RECOMMENDATIONS:  Pt cleared to participate in OT evaluation by RN. Pt in bed, alert, and agreeable to participate with CNA briefly present assessing vitals.Educated on the role of OT, evaluation process, and safety during this admission with pt verbalizing understanding. Pt is independent with basic self-care and functional transfer in preparation for ADLs. Based on the objective data described below, pt presents with no deficits that impede pt function with ADLs, functional transfers, and functional mobility in preparation for selfcare tasks. Pt left all needs met and call bell in reach.      Maximum therapeutic gains met at current level of care and patient will be discharged from occupational therapy at this time.    Further Equipment Recommendations for Discharge: N/A    [facility]: AM-PAC Inpatient Daily Activity Raw Score: 24    At this time and based on an AM-PAC score, no further OT is recommended upon discharge due to patient at baseline functional status for ADLs. Recommend patient returns to prior setting with prior services.    This [facility] score should be considered in conjunction with interdisciplinary team recommendations to determine the most appropriate discharge setting. Patient's social support, diagnosis, medical stability, and prior level of function should also be taken into consideration.     SUBJECTIVE:   Patient stated “Can you warm up my coffee?”    OBJECTIVE DATA SUMMARY:     Past Medical History:   Diagnosis Date    Asthma     Seizures (HCC)    No past surgical history on file.    Home Situation:   Social/Functional History  Type of Home: Facility ([facility])  Home Layout: One

## 2024-05-15 NOTE — CONSULTS
Drug use: Not on file    Sexual activity: Not on file   Other Topics Concern    Not on file   Social History Narrative    Not on file     Social Determinants of Health     Financial Resource Strain: Not on file   Food Insecurity: No Food Insecurity (2024)    Hunger Vital Sign     Worried About Running Out of Food in the Last Year: Never true     Ran Out of Food in the Last Year: Never true   Transportation Needs: Unmet Transportation Needs (2024)    PRAPARE - Transportation     Lack of Transportation (Medical): Yes     Lack of Transportation (Non-Medical): Yes   Physical Activity: Not on file   Stress: Not on file   Social Connections: Not on file   Intimate Partner Violence: Not on file   Housing Stability: High Risk (2024)    Housing Stability Vital Sign     Unable to Pay for Housing in the Last Year: Yes     Number of Places Lived in the Last Year: 2     Unstable Housing in the Last Year: Yes     Social History     Tobacco Use   Smoking Status Not on file   Smokeless Tobacco Not on file        Temp (24hrs), Av.8 °F (36.6 °C), Min:97.3 °F (36.3 °C), Max:98.6 °F (37 °C)    /70   Pulse 81   Temp 97.3 °F (36.3 °C) (Axillary)   Resp 17   Ht 1.778 m (5' 10\")   Wt 85.3 kg (188 lb)   SpO2 98%   BMI 26.98 kg/m²     ROS: 12 point ROS obtained in details. Pertinent positives as mentioned in HPI,   otherwise negative    Physical Exam:    General:  Alert, cooperative, no distress, appears stated age.              Head: Normocephalic, without obvious abnormality, atraumatic.     Eyes:  Conjunctivae/corneas clear.  EOMs intact.   Nose: Nares normal. No drainage or sinus tenderness.   Neck: Supple, symmetrical, trachea midline, no adenopathy, thyroid: no enlargement,  no JVD.   Lungs:   Clear to auscultation bilaterally.   Heart:  Regular rate and rhythm, S1, S2 normal.     Abdomen: Soft, non-tender. Bowel sounds normal.    Extremities: Extremities normal, atraumatic, no cyanosis or edema.    Pulses: 2+ and symmetric all extremities.   Skin:  No rashes or lesions.  Diffuse, scaly, pitted skin lesions noted on face.  cutaneous lesions in various stages of healing in the upper arms, upper thighs, and trunk.  Patient states that been ongoing for over a year.  No lesions noted in the oral mucosa.   Neurologic: AAOx3, No focal motor or sensory deficit.       Labs: Results:   Chemistry Recent Labs     05/14/24  1310 05/15/24  0142   GLUCOSE 142* 77   * 136   K 3.7 3.7   CL 99* 103   CO2 24 29   BUN 13 14   CREATININE 1.19 0.86   GLOB 5.5* 4.8*   ALT 24 21   AST 24 23      CBC w/Diff Recent Labs     05/14/24  1310 05/15/24  0142   WBC 5.6 4.1*   RBC 4.09* 3.71*   HGB 12.2* 10.9*   HCT 36.7 33.3*    275      Microbiology Results       Procedure Component Value Units Date/Time    Culture, Blood 1 [[id number]] Collected: 05/14/24 2143    Order Status: Completed Specimen: Blood Updated: 05/15/24 0728     Special Requests NO SPECIAL REQUESTS        Culture NO GROWTH AFTER 9 HOURS       Culture, Blood 2 [[id number]] Collected: 05/14/24 2138    Order Status: Completed Specimen: Blood Updated: 05/15/24 0728     Special Requests NO SPECIAL REQUESTS        Culture NO GROWTH AFTER 9 HOURS       Culture, CSF [[id number]]     Order Status: Sent Specimen: CSF     Gram stain CSF [[id number]]     Order Status: Sent Specimen: Body Fluid     Culture, HSV [[id number]]     Order Status: Sent     VDRL CSF [[id number]]     Order Status: Sent Specimen: CSF                 RADIOLOGY:    All available imaging studies/reports in [facility] for this admission were reviewed      Disclaimer: Sections of this note are dictated utilizing voice recognition software, which may have resulted in some phonetic based errors in grammar and contents. Even though attempts were made to correct all the mistakes, some may have been missed, and remained in the body of the document. If questions arise, please contact our

## 2024-05-15 NOTE — PROGRESS NOTES
Brought pt down for MRI , pt has wrist monitor on for probation ..Unable to scan patient until this is removed. Notified nurse, nurse states she will notify provider

## 2024-05-15 NOTE — PROCEDURES
PROCEDURE NOTE  Date: 5/15/2024   Name: Robert BRICENO Washington  YOB: 1990    Procedures        Csf handed to patrick in labs at 13:04

## 2024-05-15 NOTE — PROGRESS NOTES
[facility]  Progress Note    Patient: Robert Soto Age: 33 y.o. : 1990 MR#: [id number] SSN: [id number]      Subjective/24-hour events:     Sitting in chair at bedside.  No new issues overnight.    Assessment:   Seizure, new onset, possibly secondary to alcohol withdrawal  Lactic acidosis   History of alcoholism    Plan:   Neurology recommendations noted/appreciated.  Await MRI, LP, EEG.  HIV testing.  Continue Keppra as ordered for now.  Pyschiatry eval.  Trend labs.    Anticipated disposition: 2 days/home v. outpatient rehab    Case discussed with:  [x]Patient  []Family  [x] Nursing  [x]Case Management  DVT Prophylaxis:  []Lovenox  [x]Hep SQ  []SCDs  []Coumadin   []On Heparin gtt []PO anticoagulant    Objective:   VS: /70   Pulse 81   Temp 97.3 °F (36.3 °C) (Axillary)   Resp 17   Ht 1.778 m (5' 10\")   Wt 85.3 kg (188 lb)   SpO2 98%   BMI 26.98 kg/m²      Tmax/24hrs: Temp (24hrs), Av.8 °F (36.6 °C), Min:97.3 °F (36.3 °C), Max:98.6 °F (37 °C)  No intake or output data in the 24 hours ending 05/15/24 1238    Gen:  In NAD.  Lungs: Clear, no wheezes  Effort nonlabored.  CV: RRR.  Abdomen: Soft, NTTP.  Extremities: Warm, no pitting edema or ischemia.  Neuro:  Awake and alert, moves extremities spontaneously.    Current Facility-Administered Medications   Medication Dose Route Frequency    cetirizine (ZYRTEC) tablet 10 mg  10 mg Oral Daily    folic acid (FOLVITE) tablet 1 mg  1 mg Oral Daily    mirtazapine (REMERON) tablet 15 mg  15 mg Oral Nightly    multivitamin 1 tablet  1 tablet Oral Daily    triamcinolone (KENALOG) 0.1 % cream   Topical BID    sodium chloride flush 0.9 % injection 5-40 mL  5-40 mL IntraVENous 2 times per day    sodium chloride flush 0.9 % injection 5-40 mL  5-40 mL IntraVENous PRN    0.9 % sodium chloride infusion   IntraVENous PRN    potassium chloride 10 mEq/100 mL IVPB (Peripheral Line)  10 mEq IntraVENous PRN    magnesium

## 2024-05-15 NOTE — PLAN OF CARE
Problem: Discharge Planning  Goal: Discharge to home or other facility with appropriate resources  5/15/2024 1810 by Candelaria Kaplan, RN  Outcome: Progressing  5/15/2024 1810 by Candelaria Kaplan RN  Outcome: Progressing     Problem: Pain  Goal: Verbalizes/displays adequate comfort level or baseline comfort level  5/15/2024 1810 by Candelaria Kaplan, RN  Outcome: Progressing  5/15/2024 1810 by Candelaria Kaplan RN  Outcome: Progressing     Problem: Safety - Adult  Goal: Free from fall injury  Outcome: Progressing

## 2024-05-15 NOTE — CONSULTS
Ref Range    Lipase 29 13 - 75 U/L   EKG 12 Lead (Syncope)    Collection Time: 05/14/24  1:40 PM   Result Value Ref Range    Ventricular Rate 74 BPM    Atrial Rate 74 BPM    P-R Interval 128 ms    QRS Duration 100 ms    Q-T Interval 386 ms    QTc Calculation (Bazett) 428 ms    P Axis 66 degrees    R Axis -15 degrees    T Axis 12 degrees    Diagnosis       Normal sinus rhythm  Normal ECG  No previous ECGs available  Confirmed by MD Ana Laura, Lazarus ([id number]) on 5/15/2024 9:36:10 AM     Lactic Acid    Collection Time: 05/14/24  1:52 PM   Result Value Ref Range    Lactic Acid, Plasma 5.9 (HH) 0.4 - 2.0 MMOL/L   T. pallidum Ab    Collection Time: 05/14/24  1:52 PM   Result Value Ref Range    T Pallidun Ab NONREACTIVE NR     Urinalysis    Collection Time: 05/14/24  2:55 PM   Result Value Ref Range    Color, UA YELLOW      Appearance CLEAR      Specific Gravity, UA 1.006 1.005 - 1.030      pH, Urine 6.5 5.0 - 8.0      Protein, UA Negative NEG mg/dL    Glucose, Ur Negative NEG mg/dL    Ketones, Urine Negative NEG mg/dL    Bilirubin, Urine Negative NEG      Blood, Urine Negative NEG      Urobilinogen, Urine 0.2 0.2 - 1.0 EU/dL    Nitrite, Urine Negative NEG      Leukocyte Esterase, Urine Negative NEG     Urine Drug Screen    Collection Time: 05/14/24  2:55 PM   Result Value Ref Range    Benzodiazepines, Urine Negative NEG      Barbiturates, Urine Negative NEG      THC, TH-Cannabinol, Urine Negative NEG      Opiates, Urine Negative NEG      Phencyclidine, Urine Negative NEG      Cocaine, Urine Negative NEG      Amphetamine, Urine Negative NEG      Methadone, Urine Negative NEG      Comments: (NOTE)    Lactic Acid    Collection Time: 05/14/24  2:55 PM   Result Value Ref Range    Lactic Acid, Plasma 3.5 (HH) 0.4 - 2.0 MMOL/L   Lactic Acid    Collection Time: 05/14/24  5:40 PM   Result Value Ref Range    Lactic Acid, Plasma 1.3 0.4 - 2.0 MMOL/L   Culture, Blood 2    Collection Time: 05/14/24  9:38 PM    Specimen: Blood   Result  Value Ref Range    Special Requests NO SPECIAL REQUESTS      Culture NO GROWTH AFTER 9 HOURS     Culture, Blood 1    Collection Time: 05/14/24  9:43 PM    Specimen: Blood   Result Value Ref Range    Special Requests NO SPECIAL REQUESTS      Culture NO GROWTH AFTER 9 HOURS     EKG 12 Lead    Collection Time: 05/14/24 10:02 PM   Result Value Ref Range    Ventricular Rate 74 BPM    Atrial Rate 74 BPM    P-R Interval 136 ms    QRS Duration 104 ms    Q-T Interval 398 ms    QTc Calculation (Bazett) 441 ms    P Axis 64 degrees    R Axis -33 degrees    T Axis 20 degrees    Diagnosis       Normal sinus rhythm with sinus arrhythmia  Left axis deviation  Abnormal ECG  When compared with ECG of 14-MAY-2024 13:40,  No significant change was found     CBC with Auto Differential    Collection Time: 05/15/24  1:42 AM   Result Value Ref Range    WBC 4.1 (L) 4.6 - 13.2 K/uL    RBC 3.71 (L) 4.35 - 5.65 M/uL    Hemoglobin 10.9 (L) 13.0 - 16.0 g/dL    Hematocrit 33.3 (L) 36.0 - 48.0 %    MCV 89.8 78.0 - 100.0 FL    MCH 29.4 24.0 - 34.0 PG    MCHC 32.7 31.0 - 37.0 g/dL    RDW 12.6 11.6 - 14.5 %    Platelets 275 135 - 420 K/uL    MPV 10.3 9.2 - 11.8 FL    Nucleated RBCs 0.0 0  WBC    nRBC 0.00 0.00 - 0.01 K/uL    Neutrophils % 67 40 - 73 %    Lymphocytes % 15 (L) 21 - 52 %    Monocytes % 15 (H) 3 - 10 %    Eosinophils % 2 0 - 5 %    Basophils % 1 0 - 2 %    Immature Granulocytes % 1 (H) 0.0 - 0.5 %    Neutrophils Absolute 2.7 1.8 - 8.0 K/UL    Lymphocytes Absolute 0.6 (L) 0.9 - 3.6 K/UL    Monocytes Absolute 0.6 0.05 - 1.2 K/UL    Eosinophils Absolute 0.1 0.0 - 0.4 K/UL    Basophils Absolute 0.0 0.0 - 0.1 K/UL    Immature Granulocytes Absolute 0.0 0.00 - 0.04 K/UL    Differential Type AUTOMATED     Comprehensive Metabolic Panel    Collection Time: 05/15/24  1:42 AM   Result Value Ref Range    Sodium 136 136 - 145 mmol/L    Potassium 3.7 3.5 - 5.5 mmol/L    Chloride 103 100 - 111 mmol/L    CO2 29 21 - 32 mmol/L    Anion Gap 4 3.0 -

## 2024-05-16 PROBLEM — E87.20 LACTIC ACIDOSIS: Status: ACTIVE | Noted: 2024-05-16

## 2024-05-16 PROBLEM — F19.10 POLYSUBSTANCE ABUSE (HCC): Status: ACTIVE | Noted: 2024-05-16

## 2024-05-16 LAB
ACE SERPL-CCNC: 97 U/L (ref 14–82)
APPEARANCE CSF: CLEAR
BACTERIA SPEC CULT: NORMAL
BACTERIA SPEC CULT: NORMAL
C GATTII+NEOFOR DNA CSF QL NAA+NON-PROBE: NOT DETECTED
CMV DNA CSF QL NAA+NON-PROBE: NOT DETECTED
COLOR CSF: COLORLESS
E COLI K1 DNA CSF QL NAA+NON-PROBE: NOT DETECTED
EV RNA CSF QL NAA+NON-PROBE: NOT DETECTED
GP B STREP DNA CSF QL NAA+NON-PROBE: NOT DETECTED
GRAM STN SPEC: NORMAL
HAEM INFLU DNA CSF QL NAA+NON-PROBE: NOT DETECTED
HHV6 DNA CSF QL NAA+NON-PROBE: NOT DETECTED
HIV 1+2 AB+HIV1 P24 AG SERPL QL IA: NONREACTIVE
HIV 1/2 RESULT COMMENT: NORMAL
HSV1 DNA CSF QL NAA+PROBE: NOT DETECTED
HSV2 DNA CSF QL NAA+NON-PROBE: NOT DETECTED
L MONOCYTOG DNA CSF QL NAA+NON-PROBE: NOT DETECTED
N MEN DNA CSF QL NAA+NON-PROBE: NOT DETECTED
PARECHOVIRUS A RNA CSF QL NAA+NON-PROBE: NOT DETECTED
RBC # CSF: 55 /CU MM
REAGIN AB CSF QL: NON REACTIVE
S PNEUM DNA CSF QL NAA+NON-PROBE: NOT DETECTED
SERVICE CMNT-IMP: NORMAL
TUBE # CSF: 4
VZV DNA CSF QL NAA+NON-PROBE: NOT DETECTED
WBC # CSF: 5 /CU MM (ref 0–5)

## 2024-05-16 PROCEDURE — 2580000003 HC RX 258: Performed by: INTERNAL MEDICINE

## 2024-05-16 PROCEDURE — 6360000002 HC RX W HCPCS: Performed by: INTERNAL MEDICINE

## 2024-05-16 PROCEDURE — 1100000003 HC PRIVATE W/ TELEMETRY

## 2024-05-16 PROCEDURE — 6370000000 HC RX 637 (ALT 250 FOR IP): Performed by: INTERNAL MEDICINE

## 2024-05-16 PROCEDURE — 99232 SBSQ HOSP IP/OBS MODERATE 35: CPT | Performed by: FAMILY MEDICINE

## 2024-05-16 PROCEDURE — 94761 N-INVAS EAR/PLS OXIMETRY MLT: CPT

## 2024-05-16 RX ADMIN — HEPARIN SODIUM 5000 UNITS: 5000 INJECTION INTRAVENOUS; SUBCUTANEOUS at 21:22

## 2024-05-16 RX ADMIN — CETIRIZINE HYDROCHLORIDE 10 MG: 10 TABLET, FILM COATED ORAL at 09:35

## 2024-05-16 RX ADMIN — HEPARIN SODIUM 5000 UNITS: 5000 INJECTION INTRAVENOUS; SUBCUTANEOUS at 13:58

## 2024-05-16 RX ADMIN — FOLIC ACID 1 MG: 1 TABLET ORAL at 09:35

## 2024-05-16 RX ADMIN — SODIUM CHLORIDE, PRESERVATIVE FREE 10 ML: 5 INJECTION INTRAVENOUS at 21:23

## 2024-05-16 RX ADMIN — HEPARIN SODIUM 5000 UNITS: 5000 INJECTION INTRAVENOUS; SUBCUTANEOUS at 09:36

## 2024-05-16 RX ADMIN — SODIUM CHLORIDE, POTASSIUM CHLORIDE, SODIUM LACTATE AND CALCIUM CHLORIDE: 600; 310; 30; 20 INJECTION, SOLUTION INTRAVENOUS at 13:38

## 2024-05-16 RX ADMIN — THERA TABS 1 TABLET: TAB at 09:35

## 2024-05-16 RX ADMIN — LEVETIRACETAM 500 MG: 500 TABLET, FILM COATED ORAL at 09:35

## 2024-05-16 RX ADMIN — TRIAMCINOLONE ACETONIDE: 1 CREAM TOPICAL at 21:25

## 2024-05-16 RX ADMIN — MIRTAZAPINE 15 MG: 15 TABLET, FILM COATED ORAL at 21:22

## 2024-05-16 RX ADMIN — TRIAMCINOLONE ACETONIDE: 1 CREAM TOPICAL at 09:00

## 2024-05-16 RX ADMIN — LEVETIRACETAM 500 MG: 500 TABLET, FILM COATED ORAL at 21:22

## 2024-05-16 RX ADMIN — SODIUM CHLORIDE, PRESERVATIVE FREE 10 ML: 5 INJECTION INTRAVENOUS at 09:38

## 2024-05-16 ASSESSMENT — PAIN SCALES - GENERAL
PAINLEVEL_OUTOF10: 0

## 2024-05-16 NOTE — PROGRESS NOTES
Erica adrianeet is still on patient. Unit will call custodial to request for it to be removed for mri.

## 2024-05-16 NOTE — PROGRESS NOTES
Infectious Disease progress Note        Reason: HSV encephalitis    Current abx Prior abx         Lines:       Assessment :   33 y.o.  male with a past medical history of asthma, alcohol abuse, chronic facial and body rash who presented to [facility] on 5/14/24  from [facility] for seizure.       CT head 5/14/24 with  increased hypoattenuation of the right temporal lobe white matter     I agree that CT results and recent seizures concerning for herpes encephalitis.  Alternatively, seizures could be due to alcohol withdrawal.   However, CT findings are new compared to CT scan at [facility] 11/2023. Hence, recommend further imaging/CSF studies to determine significance of CT findings & help treat any reversible pathology.     ?  History of sarcoidosis -rule out neurosarcoidosis      Recommendations:    Obtain MRI brain with gadolinium  Add on csf cell count, csf meningitis pathogen panel pcr  Follow-up neurology recommendations  Will make decision about IV antivirals based on above test results, clinical course    Above plan was discussed in details with patient,  and primary team. Please call me if any further questions or concerns. Will continue to participate in the care of this patient.    HPI:    Denies headaches, fever, chills        Past Medical History:   Diagnosis Date    Asthma     Seizures (HCC)        No past surgical history on file.    @[email or web address]@    Current Facility-Administered Medications   Medication Dose Route Frequency    cetirizine (ZYRTEC) tablet 10 mg  10 mg Oral Daily    folic acid (FOLVITE) tablet 1 mg  1 mg Oral Daily    mirtazapine (REMERON) tablet 15 mg  15 mg Oral Nightly    multivitamin 1 tablet  1 tablet Oral Daily    triamcinolone (KENALOG) 0.1 % cream   Topical BID    sodium chloride flush 0.9 % injection 5-40 mL  5-40 mL IntraVENous 2 times per day    sodium chloride flush 0.9 % injection 5-40 mL  5-40 mL IntraVENous PRN    0.9 % sodium chloride infusion   IntraVENous PRN     potassium chloride 10 mEq/100 mL IVPB (Peripheral Line)  10 mEq IntraVENous PRN    magnesium sulfate 2000 mg in 50 mL IVPB premix  2,000 mg IntraVENous PRN    ondansetron (ZOFRAN-ODT) disintegrating tablet 4 mg  4 mg Oral Q8H PRN    Or    ondansetron (ZOFRAN) injection 4 mg  4 mg IntraVENous Q6H PRN    polyethylene glycol (GLYCOLAX) packet 17 g  17 g Oral Daily PRN    acetaminophen (TYLENOL) tablet 650 mg  650 mg Oral Q6H PRN    Or    acetaminophen (TYLENOL) suppository 650 mg  650 mg Rectal Q6H PRN    lactated ringers IV soln infusion   IntraVENous Continuous    LORazepam (ATIVAN) injection 2 mg  2 mg IntraVENous Q5 Min PRN    sodium chloride flush 0.9 % injection 5-40 mL  5-40 mL IntraVENous 2 times per day    sodium chloride flush 0.9 % injection 5-40 mL  5-40 mL IntraVENous PRN    0.9 % sodium chloride infusion   IntraVENous PRN    levETIRAcetam (KEPPRA) tablet 500 mg  500 mg Oral BID    hydrOXYzine HCl (ATARAX) tablet 10 mg  10 mg Oral TID PRN    heparin (porcine) injection 5,000 Units  5,000 Units SubCUTAneous 3 times per day       Allergies: Patient has no known allergies.    No family history on file.  Social History     Socioeconomic History    Marital status: Single     Spouse name: Not on file    Number of children: Not on file    Years of education: Not on file    Highest education level: Not on file   Occupational History    Not on file   Tobacco Use    Smoking status: Not on file    Smokeless tobacco: Not on file   Substance and Sexual Activity    Alcohol use: Not on file    Drug use: Not on file    Sexual activity: Not on file   Other Topics Concern    Not on file   Social History Narrative    Not on file     Social Determinants of Health     Financial Resource Strain: Not on file   Food Insecurity: No Food Insecurity (5/14/2024)    Hunger Vital Sign     Worried About Running Out of Food in the Last Year: Never true     Ran Out of Food in the Last Year: Never true   Transportation Needs: Unmet

## 2024-05-16 NOTE — PROGRESS NOTES
4 Eyes Skin Assessment     NAME:  Robert Soto  YOB: 1990  MEDICAL RECORD NUMBER:  [id number]    The patient is being assessed for  Shift Handoff    I agree that at least one RN has performed a thorough Head to Toe Skin Assessment on the patient. ALL assessment sites listed below have been assessed.      Areas assessed by both nurses:    Head, Face, Ears, Shoulders, Back, Chest, Arms, Elbows, Hands, Sacrum. Buttock, Coccyx, Ischium, Legs. Feet and Heels, and Under Medical Devices         Does the Patient have a Wound? No noted wound(s)       Benjamín Prevention initiated by RN: Yes  Wound Care Orders initiated by RN: No    Pressure Injury (Stage 3,4, Unstageable, DTI, NWPT, and Complex wounds) if present, place Wound referral order by RN under : No    New Ostomies, if present place, Ostomy referral order under : No     Nurse 1 eSignature: Electronically signed by ARTHUR BASS RN on 5/16/24 at 7:14 PM EDT    **SHARE this note so that the co-signing nurse can place an eSignature**    Nurse 2 eSignature: Electronically signed by BRAN LONG RN on 5/17/24 at 1:23 AM EDT

## 2024-05-16 NOTE — PLAN OF CARE
Problem: Discharge Planning  Goal: Discharge to home or other facility with appropriate resources  5/16/2024 1502 by Carlos Monahan, RN  Outcome: Progressing  5/16/2024 0446 by Jacquelyn Rivas RN  Outcome: Progressing     Problem: Pain  Goal: Verbalizes/displays adequate comfort level or baseline comfort level  5/16/2024 1502 by Carlos Monahan, RN  Outcome: Progressing  5/16/2024 0446 by Jacquelyn Rivas RN  Outcome: Progressing     Problem: Safety - Adult  Goal: Free from fall injury  5/16/2024 1502 by Carlos Monahan, RN  Outcome: Progressing  5/16/2024 0446 by Jacquelyn Rivas RN  Outcome: Progressing

## 2024-05-16 NOTE — PROGRESS NOTES
Two attempts made to visit patient. Patient was sleeping and the second time around, patient was in the bathroom. Patient is not available to be assessed at this time.      alejandra Green, [facility]  Spiritual Care   [phone]

## 2024-05-16 NOTE — CASE COMMUNICATION
COMMUNICATION NOTE - Neurology Service    Name:  Robert Soto     MRN: [id number]         Communication Note:   I discussed with the hospitalist and ID  team   Patient is stable clinically  LP results are pending,   MRIs still pending  Neurology will follow along  Please do not hesitate to call with questions or concerns.  Please let us know if we can provide any additional information.

## 2024-05-16 NOTE — PLAN OF CARE
Problem: Discharge Planning  Goal: Discharge to home or other facility with appropriate resources  5/16/2024 0446 by Jacquelyn Rivas RN  Outcome: Progressing  5/15/2024 1810 by Candelaria Kaplan RN  Outcome: Progressing  5/15/2024 1810 by Candelaria Kaplan RN  Outcome: Progressing     Problem: Pain  Goal: Verbalizes/displays adequate comfort level or baseline comfort level  5/16/2024 0446 by Jacquelyn Rivas RN  Outcome: Progressing  5/15/2024 1810 by Candelaria Kaplan RN  Outcome: Progressing  5/15/2024 1810 by Candelaria Kaplan RN  Outcome: Progressing     Problem: Safety - Adult  Goal: Free from fall injury  5/16/2024 0446 by Jacquelyn Rivas RN  Outcome: Progressing  5/15/2024 1810 by Candelaria Kaplan RN  Outcome: Progressing

## 2024-05-17 ENCOUNTER — APPOINTMENT (OUTPATIENT)
Facility: HOSPITAL | Age: 34
End: 2024-05-17
Payer: MEDICAID

## 2024-05-17 LAB
ANGIOTENSIN CONVERTING ENZYME CSF: 8.3 U/L (ref 0–2.5)
HSV SPEC CULT: NEGATIVE
SPECIMEN SOURCE: NORMAL

## 2024-05-17 PROCEDURE — 6360000002 HC RX W HCPCS: Performed by: INTERNAL MEDICINE

## 2024-05-17 PROCEDURE — 70553 MRI BRAIN STEM W/O & W/DYE: CPT

## 2024-05-17 PROCEDURE — A9577 INJ MULTIHANCE: HCPCS | Performed by: INTERNAL MEDICINE

## 2024-05-17 PROCEDURE — 2580000003 HC RX 258: Performed by: INTERNAL MEDICINE

## 2024-05-17 PROCEDURE — 6370000000 HC RX 637 (ALT 250 FOR IP): Performed by: FAMILY MEDICINE

## 2024-05-17 PROCEDURE — 94761 N-INVAS EAR/PLS OXIMETRY MLT: CPT

## 2024-05-17 PROCEDURE — 99232 SBSQ HOSP IP/OBS MODERATE 35: CPT | Performed by: FAMILY MEDICINE

## 2024-05-17 PROCEDURE — 72156 MRI NECK SPINE W/O & W/DYE: CPT

## 2024-05-17 PROCEDURE — 6370000000 HC RX 637 (ALT 250 FOR IP): Performed by: INTERNAL MEDICINE

## 2024-05-17 PROCEDURE — 6360000004 HC RX CONTRAST MEDICATION: Performed by: INTERNAL MEDICINE

## 2024-05-17 PROCEDURE — 1100000003 HC PRIVATE W/ TELEMETRY

## 2024-05-17 PROCEDURE — 72157 MRI CHEST SPINE W/O & W/DYE: CPT

## 2024-05-17 RX ORDER — PREDNISONE 20 MG/1
40 TABLET ORAL DAILY
Status: DISCONTINUED | OUTPATIENT
Start: 2024-05-17 | End: 2024-05-18 | Stop reason: HOSPADM

## 2024-05-17 RX ORDER — PREDNISONE 20 MG/1
40 TABLET ORAL DAILY
Status: DISCONTINUED | OUTPATIENT
Start: 2024-05-17 | End: 2024-05-17

## 2024-05-17 RX ADMIN — FOLIC ACID 1 MG: 1 TABLET ORAL at 08:14

## 2024-05-17 RX ADMIN — HEPARIN SODIUM 5000 UNITS: 5000 INJECTION INTRAVENOUS; SUBCUTANEOUS at 14:52

## 2024-05-17 RX ADMIN — SODIUM CHLORIDE, PRESERVATIVE FREE 10 ML: 5 INJECTION INTRAVENOUS at 08:15

## 2024-05-17 RX ADMIN — PREDNISONE 40 MG: 20 TABLET ORAL at 17:29

## 2024-05-17 RX ADMIN — LEVETIRACETAM 500 MG: 500 TABLET, FILM COATED ORAL at 08:14

## 2024-05-17 RX ADMIN — GADOBENATE DIMEGLUMINE 17 ML: 529 INJECTION, SOLUTION INTRAVENOUS at 19:49

## 2024-05-17 RX ADMIN — HEPARIN SODIUM 5000 UNITS: 5000 INJECTION INTRAVENOUS; SUBCUTANEOUS at 22:19

## 2024-05-17 RX ADMIN — THERA TABS 1 TABLET: TAB at 08:14

## 2024-05-17 RX ADMIN — MIRTAZAPINE 15 MG: 15 TABLET, FILM COATED ORAL at 22:18

## 2024-05-17 RX ADMIN — CETIRIZINE HYDROCHLORIDE 10 MG: 10 TABLET, FILM COATED ORAL at 08:14

## 2024-05-17 RX ADMIN — LEVETIRACETAM 500 MG: 500 TABLET, FILM COATED ORAL at 22:18

## 2024-05-17 RX ADMIN — TRIAMCINOLONE ACETONIDE: 1 CREAM TOPICAL at 08:15

## 2024-05-17 ASSESSMENT — PAIN SCALES - GENERAL
PAINLEVEL_OUTOF10: 0

## 2024-05-17 NOTE — PROGRESS NOTES
Infectious Disease progress Note        Reason: HSV encephalitis    Current abx Prior abx         Lines:       Assessment :   33 y.o.  male with a past medical history of asthma, alcohol abuse, chronic facial and body rash who presented to [facility] on 5/14/24  from [facility] for seizure.       CT head 5/14/24 with  increased hypoattenuation of the right temporal lobe white matter     I agree that CT results and recent seizures concerning for herpes encephalitis.  Alternatively, seizures could be due to alcohol withdrawal,neurosarcoidosis related.     S/p LP on 5/15. Wbc 5, glucose 45, protein 372, ACE levels 8.3  Negative csf HSV PCR 5/15   Lack of CSF pleocytosis, negative HSV PCR argues against herpes encephalitis.  Elevated CSF ACE levels concerning for neurosarcoidosis.  Management complicated due to inability to do MRI brain since patient has right wrist bracelet    ?  History of sarcoidosis -rule out neurosarcoidosis    Clinically stable from infectious disease standpoint    Recommendations:    No further interventions needed from infectious disease standpoint  Follow-up neurology recommendations regarding MRI, further workup of neurosarcoidosis  Okay to use steroids from infectious disease standpoint   discharge planning per primary team    Above plan was discussed in details with patient, dr. Tucker, dr. Ramirez. Please call me if any further questions or concerns. Will continue to participate in the care of this patient.    HPI:    Denies headaches, fever, chills        Past Medical History:   Diagnosis Date    Asthma     Seizures (HCC)        No past surgical history on file.    @[facility]@    Current Facility-Administered Medications   Medication Dose Route Frequency    cetirizine (ZYRTEC) tablet 10 mg  10 mg Oral Daily    folic acid (FOLVITE) tablet 1 mg  1 mg Oral Daily    mirtazapine (REMERON) tablet 15 mg  15 mg Oral Nightly    multivitamin 1 tablet  1 tablet Oral Daily    triamcinolone  Herpes simplex virus 2 CSF by PCR Not detected        Human herpesvirus 6 CSF by PCR Not detected        Human parechovirus CSF by PCR Not detected        Varicella zoster virus CSF by PCR Not detected        Cryptococcus neoformans/gattii CSF by PCR Not detected       Culture, Blood 1 [[id number]] Collected: 05/14/24 2143    Order Status: Completed Specimen: Blood Updated: 05/17/24 0642     Special Requests NO SPECIAL REQUESTS        Culture NO GROWTH 3 DAYS       Culture, Blood 2 [[id number]] Collected: 05/14/24 2138    Order Status: Completed Specimen: Blood Updated: 05/17/24 0642     Special Requests NO SPECIAL REQUESTS        Culture NO GROWTH 3 DAYS       Gram stain CSF [[id number]] Collected: 05/14/24 1945    Order Status: Canceled Specimen: Body Fluid                 RADIOLOGY:    All available imaging studies/reports in [facility] care for this admission were reviewed        Disclaimer: Sections of this note are dictated utilizing voice recognition software, which may have resulted in some phonetic based errors in grammar and contents. Even though attempts were made to correct all the mistakes, some may have been missed, and remained in the body of the document. If questions arise, please contact our department.    Dr. Rosi Moise, Infectious Disease Specialist  [phone]  May 17, 2024  12:30 PM

## 2024-05-17 NOTE — CASE COMMUNICATION
COMMUNICATION NOTE - Neurology Service    Name:  Robert Soto     MRN: [id number]         Communication Note:   I discussed with the hospitalist team.  I explained in detail about the current condition.   Patient does not have any neuro deficits in the entire hospitalization.   MRI brain and spine still pending - Facility not responding-We do not know the time frame yet  If okay with ID and primary team  - Can start prednisone 0.5 mg/kg per day for two weeks   Follow up with Neuroimmunology clinic in the out patient clinic.  Rest of the management per primary team.  Please do not hesitate to call with questions or concerns.  Please let us know if we can provide any additional information.

## 2024-05-17 NOTE — PLAN OF CARE
Problem: Discharge Planning  Goal: Discharge to home or other facility with appropriate resources  5/16/2024 2044 by Jacquelyn Rivas RN  Outcome: Progressing  5/16/2024 1502 by Carlos Monahan, RN  Outcome: Progressing     Problem: Pain  Goal: Verbalizes/displays adequate comfort level or baseline comfort level  5/16/2024 2044 by Jacquelyn Rivas RN  Outcome: Progressing  5/16/2024 1502 by Carlos Monahan, RN  Outcome: Progressing     Problem: Safety - Adult  Goal: Free from fall injury  5/16/2024 2044 by Jacquelyn Rivas, RN  Outcome: Progressing  5/16/2024 1502 by Carlos Monahan, RN  Outcome: Progressing

## 2024-05-17 NOTE — CARE COORDINATION
ANYA spoke [facility] [phone], and spoke with Dick.   Dick confimed that patient was in their Residential program, and that the black wrist bracelet is from [facility].   ANYA explained that patient needs MRI and patient cannot get the MRI due to the black wrist bracelet.   Dick said the black bracelet can be cut off patient, and to keep the black bracelet with patient and patient's belongings.   ANYA let Dick know that patient is a possible discharge this weekend.   Dick said she will need clinicals faxed to [facility]  to be able to assess patient to see if patient qualifies to be re-admitted into their program, and to fax clinicals to [phone].         ANYA spoke with Carlos Charge nurse and updated with above information.             Katt Gaona RN  Case Management [phone]

## 2024-05-17 NOTE — PROGRESS NOTES
[facility] Hospitalist Group  Progress Note    Patient: Robert Soto Age: 33 y.o. : 1990 MR#: [id number] SSN: [id number]      Subjective/24-hour events:     No complaints, continues to feel well overall.  Remains afebrile, no nausea/vomiting/diarrhea.  No further seizure activity reported.    Assessment:   Seizure, new onset, possibly secondary to alcohol withdrawal  Lactic acidosis   History of alcoholism    Plan:   MRI today.  Monitoring braces from [facility] able to be removed but will need to be left with patient and his belongings at discharge.  Plan to initiate steroid per neurology.  Case discussed with ID again today and no current infection issues are felt to be at play.  Continue supportive care otherwise.  Will need outpatient neurology follow-up and likely referral to tertiary care center.  Anticipate discharge tomorrow pending continued clinical stability and completion of workup.    Anticipated disposition: Tomorrow/home v  return to outpatient rehab facility    Case discussed with:  [x]Patient  []Family  [x] Nursing  [x]Case Management  DVT Prophylaxis:  []Lovenox  [x]Hep SQ  []SCDs  []Coumadin   []On Heparin gtt []PO anticoagulant    Objective:   VS: /72   Pulse 79   Temp 97.9 °F (36.6 °C) (Oral)   Resp 18   Ht 1.778 m (5' 10\")   Wt 85.3 kg (188 lb)   SpO2 100%   BMI 26.98 kg/m²      Tmax/24hrs: Temp (24hrs), Av.1 °F (36.7 °C), Min:97.8 °F (36.6 °C), Max:98.5 °F (36.9 °C)  No intake or output data in the 24 hours ending 24 0848    Gen:  In NAD.  Lungs: Clear, no wheezes  Effort nonlabored.  CV: RRR.  Abdomen: Soft, NTTP.  Extremities: Warm, no pitting edema or ischemia.  Neuro:  Awake and alert, moves extremities spontaneously.    Current Facility-Administered Medications   Medication Dose Route Frequency    cetirizine (ZYRTEC) tablet 10 mg  10 mg Oral Daily    folic acid (FOLVITE) tablet 1 mg  1 mg Oral Daily    mirtazapine (REMERON)

## 2024-05-17 NOTE — CARE COORDINATION
ANYA faxed clinicals to [facility] via [employer] to [phone] to [place] per request to  re-evaluate patient for re-admission to [facility] residential program.             Katt Gaona RN  Case Management [phone]

## 2024-05-18 VITALS
HEIGHT: 70 IN | DIASTOLIC BLOOD PRESSURE: 77 MMHG | WEIGHT: 188 LBS | BODY MASS INDEX: 26.92 KG/M2 | OXYGEN SATURATION: 99 % | SYSTOLIC BLOOD PRESSURE: 129 MMHG | RESPIRATION RATE: 17 BRPM | HEART RATE: 80 BPM | TEMPERATURE: 97.9 F

## 2024-05-18 PROCEDURE — 2580000003 HC RX 258: Performed by: INTERNAL MEDICINE

## 2024-05-18 PROCEDURE — 6370000000 HC RX 637 (ALT 250 FOR IP): Performed by: FAMILY MEDICINE

## 2024-05-18 PROCEDURE — 99239 HOSP IP/OBS DSCHRG MGMT >30: CPT | Performed by: FAMILY MEDICINE

## 2024-05-18 PROCEDURE — 6360000002 HC RX W HCPCS: Performed by: INTERNAL MEDICINE

## 2024-05-18 PROCEDURE — 94761 N-INVAS EAR/PLS OXIMETRY MLT: CPT

## 2024-05-18 PROCEDURE — 6370000000 HC RX 637 (ALT 250 FOR IP): Performed by: INTERNAL MEDICINE

## 2024-05-18 RX ORDER — FOLIC ACID 1 MG/1
1 TABLET ORAL DAILY
Qty: 30 TABLET | Refills: 0 | Status: SHIPPED | OUTPATIENT
Start: 2024-05-18

## 2024-05-18 RX ORDER — MIRTAZAPINE 15 MG/1
15 TABLET, FILM COATED ORAL NIGHTLY
Qty: 30 TABLET | Refills: 0 | Status: SHIPPED | OUTPATIENT
Start: 2024-05-18

## 2024-05-18 RX ORDER — TRIAMCINOLONE ACETONIDE 1 MG/G
CREAM TOPICAL 2 TIMES DAILY
Qty: 454 G | Refills: 0 | Status: SHIPPED | OUTPATIENT
Start: 2024-05-18

## 2024-05-18 RX ORDER — PREDNISONE 20 MG/1
80 TABLET ORAL DAILY
Qty: 112 TABLET | Refills: 0 | Status: SHIPPED | OUTPATIENT
Start: 2024-05-19 | End: 2024-06-16

## 2024-05-18 RX ORDER — CETIRIZINE HYDROCHLORIDE 10 MG/1
10 TABLET ORAL DAILY
Qty: 30 TABLET | Refills: 0 | Status: SHIPPED | OUTPATIENT
Start: 2024-05-18

## 2024-05-18 RX ORDER — PREDNISONE 20 MG/1
80 TABLET ORAL DAILY
Qty: 112 TABLET | Refills: 0 | Status: SHIPPED | OUTPATIENT
Start: 2024-05-19 | End: 2024-05-18

## 2024-05-18 RX ORDER — MULTIVITAMIN
15 TABLET ORAL DAILY
Qty: 30 TABLET | Refills: 0 | Status: SHIPPED | OUTPATIENT
Start: 2024-05-18

## 2024-05-18 RX ORDER — PREDNISONE 20 MG/1
40 TABLET ORAL DAILY
Qty: 24 TABLET | Refills: 0 | Status: SHIPPED | OUTPATIENT
Start: 2024-05-19 | End: 2024-05-18

## 2024-05-18 RX ORDER — LEVETIRACETAM 500 MG/1
500 TABLET ORAL 2 TIMES DAILY
Qty: 60 TABLET | Refills: 0 | Status: SHIPPED | OUTPATIENT
Start: 2024-05-18

## 2024-05-18 RX ORDER — HYDROXYZINE HYDROCHLORIDE 10 MG/1
10 TABLET, FILM COATED ORAL 3 TIMES DAILY PRN
Qty: 30 TABLET | Refills: 0 | Status: SHIPPED | OUTPATIENT
Start: 2024-05-18 | End: 2024-05-28

## 2024-05-18 RX ADMIN — THERA TABS 1 TABLET: TAB at 09:56

## 2024-05-18 RX ADMIN — SODIUM CHLORIDE, PRESERVATIVE FREE 10 ML: 5 INJECTION INTRAVENOUS at 12:22

## 2024-05-18 RX ADMIN — PREDNISONE 40 MG: 20 TABLET ORAL at 09:56

## 2024-05-18 RX ADMIN — CETIRIZINE HYDROCHLORIDE 10 MG: 10 TABLET, FILM COATED ORAL at 09:56

## 2024-05-18 RX ADMIN — HEPARIN SODIUM 5000 UNITS: 5000 INJECTION INTRAVENOUS; SUBCUTANEOUS at 07:45

## 2024-05-18 RX ADMIN — FOLIC ACID 1 MG: 1 TABLET ORAL at 09:56

## 2024-05-18 RX ADMIN — LEVETIRACETAM 500 MG: 500 TABLET, FILM COATED ORAL at 09:56

## 2024-05-18 ASSESSMENT — PAIN SCALES - GENERAL
PAINLEVEL_OUTOF10: 0

## 2024-05-18 NOTE — CARE COORDINATION
05/18/24 1129   Service Assessment   Patient Orientation Alert and Oriented;Person;Place;Situation;Self   Cognition Alert   History Provided By Patient   Primary Caregiver Self   Support Systems Other (Comment)  ([facility])   PCP Verified by CM No  (Patient does not have a PCP.)   Prior Functional Level Independent in ADLs/IADLs   Current Functional Level Independent in ADLs/IADLs   Can patient return to prior living arrangement Unknown at present   Ability to make needs known: Good   Family able to assist with home care needs: No   Would you like for me to discuss the discharge plan with any other family members/significant others, and if so, who? No   Financial Resources Medicaid   Community Resources Other (Comment)  ([facility])   CM/SW Referral Other (see comment)  (Discharge planning)   Social/Functional History   Lives With Other (comment)  ([facility])   Type of Home Facility   Home Layout One level   Home Access Level entry   Bathroom Shower/Tub Walk-in shower   Bathroom Toilet Standard   Bathroom Equipment None   Bathroom Accessibility Accessible   Home Equipment None   Receives Help From Other (comment)  ([facility])   ADL Assistance Independent   Homemaking Assistance Independent   Homemaking Responsibilities No   Ambulation Assistance Independent   Transfer Assistance Independent   Active  No   Patient's  Info [facility] or Medicaid Transport.   Mode of Transportation Car   Occupation On disability   Discharge Planning   Type of Residence Other (Comment)  ([facility])   Living Arrangements Other (Comment)  ([facility])   Current Services Prior To Admission Other (Comment)  ([facility])   Potential Assistance Needed Other (Comment)  ([facility]

## 2024-05-18 NOTE — CARE COORDINATION
D/C order noted for today. Orders reviewed. No needs identified at this time. [facility] transporting [facility] back to [facility] today for discharge. CM remains available if needed.             Katt Gaona, [phone]

## 2024-05-18 NOTE — PROGRESS NOTES
Returned phone call to Venecia at [facility]. Answered all questions regarding patient. She will forward information for his re-evaluation.

## 2024-05-18 NOTE — DISCHARGE INSTRUCTIONS
DISCHARGE SUMMARY from Nurse    PATIENT INSTRUCTIONS:    What to do at Home:  Recommended activity: activity as tolerated,     If you experience any of the following symptoms: seizures, please follow return to the ED.    *  Please give a list of your current medications to your Primary Care Provider.    *  Please update this list whenever your medications are discontinued, doses are      changed, or new medications (including over-the-counter products) are added.    *  Please carry medication information at all times in case of emergency situations.    These are general instructions for a healthy lifestyle:    No smoking/ No tobacco products/ Avoid exposure to second hand smoke  Surgeon General's Warning:  Quitting smoking now greatly reduces serious risk to your health.    Obesity, smoking, and sedentary lifestyle greatly increases your risk for illness    A healthy diet, regular physical exercise & weight monitoring are important for maintaining a healthy lifestyle    You may be retaining fluid if you have a history of heart failure or if you experience any of the following symptoms:  Weight gain of 3 pounds or more overnight or 5 pounds in a week, increased swelling in our hands or feet or shortness of breath while lying flat in bed.  Please call your doctor as soon as you notice any of these symptoms; do not wait until your next office visit.        The discharge information has been reviewed with the patient.  The patient verbalized understanding.  Discharge medications reviewed with the patient and appropriate educational materials and side effects teaching were provided.  ___________________________________________________________________________________________________________________________________

## 2024-05-18 NOTE — CASE COMMUNICATION
COMMUNICATION NOTE - Neurology Service    Name:  Robert Soto     MRN: [id number]         Communication Note:     MRI brain  Enhancing pattern in a largely subarachnoid extra-axial involvement bilaterally  most consistent with the history of sarcoidosis     Localized mass effect involving the medial right temporal lobe is observed     Please note that there is also involvement of the 5th cranial nerves and the  cerebral pontine angles     Involvement in the fourth ventricle     Involvement in the penetrating vessels into the basal ganglia right side     Scalp involvement     Coating of the arachnoid covering of the upper cervical cord     Pattern and distribution are consistent with sarcoid     MRI  spine official result still pending   start prednisone 1  mg/kg per day for four weeks   Follow up with Neuroimmunology clinic in the out patient clinic.  Rest of the management per primary team.  Neurology will sign off.  Please do not hesitate to call with questions or concerns.  Please let us know if we can provide any additional information.

## 2024-05-18 NOTE — DISCHARGE SUMMARY
Discharge Summary    Patient: Robert Soto MRN: [id number]  CSN: [id number]    YOB: 1990  Age: 33 y.o.  Sex: male    DOA: 5/14/2024 LOS:  LOS: 4 days   Discharge Date: 5/18/2024     Admission Diagnoses: Seizure (HCC) [R56.9]  Seizure-like activity (HCC) [R56.9]    Discharge Diagnoses:    Neurosarcoidosis   Seizure, new onset  Lactic acidosis   History of alcoholism    Discharge Condition: Stable    PHYSICAL EXAM  Visit Vitals  /77   Pulse 80   Temp 97.9 °F (36.6 °C) (Oral)   Resp 17   Ht 1.778 m (5' 10\")   Wt 85.3 kg (188 lb)   SpO2 99%   BMI 26.98 kg/m²       General: In NAD.  Nontoxic-appearing.  HEENT: NCAT.  Sclerae anicteric, EOMI.   Lungs:  Clear, no wheezes.  No accessory muscle use.  Heart:  RRR.  Abdomen: Soft, NT/ND.  Extremities: Warm, no edema or ischemia.  Psych:   Mood normal.  Neurologic:  Awake and alert, moves extremities spontaneously.  Motor grossly nonfocal.    Hospital Course:   See admission H&P for full details of HPI.  Patient was admitted to the hospital after presenting to the ED from a local drug rehabilitation facility for evaluation of suspected seizure.  Initial imaging was unremarkable and patient was loaded with Keppra in ED.  Empiric antibiotics were also initiated although there was no obvious evidence for acute infection.  ID evaluation was obtained.    There has been no evidence for acute infection and workup focused on CNS workup given concern for neurosarcoid due to history of previous biopsy-proven cutaneous sarcoid.  Patient apparently never followed up after this was done and never received any treatment for his cutaneous sarcoid.    MRI was finally able to be obtained due to the fact that patient had a bracelet from [facility] that needed to be removed prior to study being done.  Imaging studies were consistent with sarcoid involvement of the CNS.  Lumbar puncture had also previously been done with noted elevations in ACE and protein              EXAM: MRI BRAIN W WO CONTRAST     CLINICAL INDICATION/HISTORY: Sarcoid evaluation     TECHNIQUE: Multisequence multiplanar MR imaging acquired through the brain.      Contrast used: 20 cc Gadavist     COMPARISON: None     FINDINGS:     Parenchyma: Abnormal     There is a focal nodular enhancing mass in the uncus on the right side     This is seen on image 12 series 3     This measures 21 x 10 x 11 mm     This sits along the surface of the uncus and this is associated with additional  diffuse nodular changes extending into the subarachnoid space in the middle  fossa     This extends to the anterior portion of the temporal lobe     There are some additional areas of increased signal passing into the basal  ganglia along the perivascular spaces     Infundibulum is nodular with thickening and associated diffuse enhancement     See image 12 series 3     On the left side additional multiple small foci of nodular enhancement  identified these are numerable the largest of these 5 mm     Additional small focus in the ambient cistern on the left side measures 8.5 x  9.3 mm image 12 series 3     There is diffuse enhancement in the fourth ventricle involving the foramen of  L[place]chka bilaterally     A portion of this may be choroid however the exuberant enhancement and  prominence raise concern this represents another area of involvement     There is also sugarcoating involving the upper cervical spine seen on image 4  series 3     The largest focal lesion on the right is also associated with some mass effect  and there is FLAIR edema measuring 41 x 65 mm on image 11 series 5     Some minimal FLAIR edema type images are associated with the left-sided changes  in the middle fossa     There is focal nodularity and enhancement of the left cerebellar tentorium over  an area of 12 x 47 mm on image 11 series 3 and coronal image 17 of series 4     Differential considerations would be lymphoma, [place]

## 2024-05-18 NOTE — CARE COORDINATION
Venecia Gamez with [facility] called , asked to speak nurse.   Nurse just went to lunch, Venecia asked that nurse call Venecia back, said she needs to ask nurse questions about patient.   Venecia [phone].           Katt Gaona RN  Case Management [phone]

## 2024-05-18 NOTE — CARE COORDINATION
ANYA spoke with Meenakshi HURLEY, and updated that [facility] could take patient back, and asked what time patient can be ready for discharge today.   Meenakshi RN said 4 pm today.         CM called [facility] phone number given by Venecia [phone], CM received voicemail, CM left message that patient is ready for discharge today, and trying to see if [facility] can transport patient back to [facility] at 4 pm today.   CM left CM phone number and nurses station phone number for a return call.           CM called [facility] [phone], and spoke with Ms Lady, and updated that Venecia Gamez said patient could return to [facility], and that CM left voicemail on the [phone] number, and that patient is ready for discharge today.   Ms Narayanan asked what time patient would be ready today, CM let Ms Narayanan know that nurse said patient could be ready by 4 pm today for discharge.   MsQuinn Lady said someone with [facility] will be picking patient up at 4 pm today.             Katt Gaona, RN  Case Management [phone]

## 2024-05-18 NOTE — CARE COORDINATION
Dr Ramirez let CM know that patient is ready for discharge.         CM called [facility] [phone], and spoke with Ms. Narayanan, Ms Lady said they need to review patient's clinicals, and see if they can accept patient back or not, and will call CM back.             Katt Gaona, RN  Case Management [phone]

## 2024-05-18 NOTE — CARE COORDINATION
ANYA received a voicemail from Venecia Gamez [phone], said she spoke with patient's nurse, then discussed with Doctor at [facility], and said that patient can return to [facility] at discharge, and that they will provide transport and to call [phone] when discharged.       ANYA Perfect Served Dr Ramirez and updated that patient can discharge back to [facility] today.               Katt Gaona, RN  Case Management [phone]

## 2024-05-19 LAB
BACTERIA SPEC CULT: NORMAL
BACTERIA SPEC CULT: NORMAL
SERVICE CMNT-IMP: NORMAL
SERVICE CMNT-IMP: NORMAL

## 2024-05-22 LAB
BACTERIA SPEC CULT: NORMAL
GRAM STN SPEC: NORMAL
SERVICE CMNT-IMP: NORMAL